# Patient Record
Sex: FEMALE | HISPANIC OR LATINO | URBAN - METROPOLITAN AREA
[De-identification: names, ages, dates, MRNs, and addresses within clinical notes are randomized per-mention and may not be internally consistent; named-entity substitution may affect disease eponyms.]

---

## 2017-01-26 ENCOUNTER — OFFICE (OUTPATIENT)
Dept: URBAN - METROPOLITAN AREA CLINIC 78 | Facility: CLINIC | Age: 42
End: 2017-01-26
Payer: COMMERCIAL

## 2017-01-26 VITALS
TEMPERATURE: 98.4 F | WEIGHT: 209 LBS | HEART RATE: 72 BPM | HEIGHT: 62 IN | SYSTOLIC BLOOD PRESSURE: 99 MMHG | DIASTOLIC BLOOD PRESSURE: 73 MMHG

## 2017-01-26 DIAGNOSIS — R10.13 EPIGASTRIC PAIN: ICD-10-CM

## 2017-01-26 PROCEDURE — 99214 OFFICE O/P EST MOD 30 MIN: CPT

## 2017-01-26 NOTE — SERVICEHPINOTES
Ms. Rider is here for follow up regarding epigastric pain. She underwent an EGD which showed gastritis but no h pylori. She is s/p cholecystectomy. Recent US from September was unremarkable. Patient speaks Armenian so history is told by Constanza. Dexilant helped at first but no longer helping. Pantoprazole helped more than the Dexilant. She is now avoiding all greasy foods. Rare NSAID use. No weight loss. She notes a lot of itching. No recent labs. No vomiting but some nausea.

## 2017-01-27 LAB
AMYLASE, SERUM: 47 U/L (ref 31–124)
HEPATIC FUNCTION PANEL (7): ALBUMIN, SERUM: 4 G/DL (ref 3.5–5.5)
HEPATIC FUNCTION PANEL (7): ALKALINE PHOSPHATASE, S: 95 IU/L (ref 39–117)
HEPATIC FUNCTION PANEL (7): ALT (SGPT): 32 IU/L (ref 0–44)
HEPATIC FUNCTION PANEL (7): AST (SGOT): 25 IU/L (ref 0–40)
HEPATIC FUNCTION PANEL (7): BILIRUBIN, DIRECT: 0.1 MG/DL (ref 0–0.4)
HEPATIC FUNCTION PANEL (7): BILIRUBIN, TOTAL: 0.3 MG/DL (ref 0–1.2)
HEPATIC FUNCTION PANEL (7): PROTEIN, TOTAL, SERUM: 7.1 G/DL (ref 6–8.5)
LIPASE, SERUM: 18 U/L (ref 0–59)

## 2017-04-05 ENCOUNTER — OFFICE (OUTPATIENT)
Dept: URBAN - METROPOLITAN AREA CLINIC 78 | Facility: CLINIC | Age: 42
End: 2017-04-05
Payer: COMMERCIAL

## 2017-04-05 VITALS
HEIGHT: 62 IN | TEMPERATURE: 97.8 F | HEART RATE: 86 BPM | DIASTOLIC BLOOD PRESSURE: 75 MMHG | SYSTOLIC BLOOD PRESSURE: 101 MMHG | WEIGHT: 206 LBS

## 2017-04-05 DIAGNOSIS — R63.5 ABNORMAL WEIGHT GAIN: ICD-10-CM

## 2017-04-05 DIAGNOSIS — K21.9 GASTRO-ESOPHAGEAL REFLUX DISEASE WITHOUT ESOPHAGITIS: ICD-10-CM

## 2017-04-05 PROCEDURE — 99214 OFFICE O/P EST MOD 30 MIN: CPT

## 2017-04-05 NOTE — SERVICEHPINOTES
ANDRIY BABCOCK   is a   41   year old    female who is being seen in consultation at the request of   MIKE CAO   for follow up. She underwent an EGD 12/2016 which showed gastritis but no h pylori. She is s/p cholecystectomy. Recent US from September was unremarkable. 1/27/2017-normal LFT's and lipase.  She has tried Dexilant, however that didn't help that much. Currently taking pantoprazole 40mg daily. Now only has GERD about once or twice a week. If she doesn't eat in a timely manner will experience GERD. She is frustrated that she continues to gain weight. She has changed her diet (eliminated sodas, decreased sweets). She had the Norplant placed 3 years ago. She skips breakfast some mornings or eats a egg sandwich with ham. Normally eats crackers for a snack. For dinner usually only drinks tea or a glass of milk, eats beans, meat or a salad.